# Patient Record
Sex: FEMALE | Race: WHITE | NOT HISPANIC OR LATINO
[De-identification: names, ages, dates, MRNs, and addresses within clinical notes are randomized per-mention and may not be internally consistent; named-entity substitution may affect disease eponyms.]

---

## 2017-10-24 ENCOUNTER — APPOINTMENT (OUTPATIENT)
Dept: GASTROENTEROLOGY | Facility: CLINIC | Age: 27
End: 2017-10-24

## 2022-09-09 ENCOUNTER — TRANSCRIPTION ENCOUNTER (OUTPATIENT)
Age: 32
End: 2022-09-09

## 2022-09-10 ENCOUNTER — RESULT REVIEW (OUTPATIENT)
Age: 32
End: 2022-09-10

## 2022-09-10 ENCOUNTER — TRANSCRIPTION ENCOUNTER (OUTPATIENT)
Age: 32
End: 2022-09-10

## 2022-09-10 ENCOUNTER — INPATIENT (INPATIENT)
Facility: HOSPITAL | Age: 32
LOS: 0 days | Discharge: HOME | DRG: 817 | End: 2022-09-10
Attending: OBSTETRICS & GYNECOLOGY | Admitting: OBSTETRICS & GYNECOLOGY
Payer: COMMERCIAL

## 2022-09-10 VITALS
WEIGHT: 130.07 LBS | RESPIRATION RATE: 18 BRPM | TEMPERATURE: 98 F | HEART RATE: 98 BPM | OXYGEN SATURATION: 98 % | DIASTOLIC BLOOD PRESSURE: 78 MMHG | SYSTOLIC BLOOD PRESSURE: 126 MMHG

## 2022-09-10 VITALS
HEART RATE: 67 BPM | DIASTOLIC BLOOD PRESSURE: 64 MMHG | RESPIRATION RATE: 18 BRPM | SYSTOLIC BLOOD PRESSURE: 119 MMHG | OXYGEN SATURATION: 100 %

## 2022-09-10 DIAGNOSIS — O00.90 UNSPECIFIED ECTOPIC PREGNANCY WITHOUT INTRAUTERINE PREGNANCY: ICD-10-CM

## 2022-09-10 LAB
ALBUMIN SERPL ELPH-MCNC: 4.3 G/DL — SIGNIFICANT CHANGE UP (ref 3.3–5.2)
ALP SERPL-CCNC: 111 U/L — SIGNIFICANT CHANGE UP (ref 40–120)
ALT FLD-CCNC: 19 U/L — SIGNIFICANT CHANGE UP
ANION GAP SERPL CALC-SCNC: 12 MMOL/L — SIGNIFICANT CHANGE UP (ref 5–17)
APPEARANCE UR: CLEAR — SIGNIFICANT CHANGE UP
AST SERPL-CCNC: 19 U/L — SIGNIFICANT CHANGE UP
BASOPHILS # BLD AUTO: 0.09 K/UL — SIGNIFICANT CHANGE UP (ref 0–0.2)
BASOPHILS NFR BLD AUTO: 0.6 % — SIGNIFICANT CHANGE UP (ref 0–2)
BILIRUB SERPL-MCNC: 0.4 MG/DL — SIGNIFICANT CHANGE UP (ref 0.4–2)
BILIRUB UR-MCNC: NEGATIVE — SIGNIFICANT CHANGE UP
BLD GP AB SCN SERPL QL: SIGNIFICANT CHANGE UP
BUN SERPL-MCNC: 14.9 MG/DL — SIGNIFICANT CHANGE UP (ref 8–20)
CALCIUM SERPL-MCNC: 9.2 MG/DL — SIGNIFICANT CHANGE UP (ref 8.4–10.5)
CHLORIDE SERPL-SCNC: 98 MMOL/L — SIGNIFICANT CHANGE UP (ref 98–107)
CO2 SERPL-SCNC: 26 MMOL/L — SIGNIFICANT CHANGE UP (ref 22–29)
COLOR SPEC: YELLOW — SIGNIFICANT CHANGE UP
CREAT SERPL-MCNC: 0.58 MG/DL — SIGNIFICANT CHANGE UP (ref 0.5–1.3)
DIFF PNL FLD: ABNORMAL
EGFR: 124 ML/MIN/1.73M2 — SIGNIFICANT CHANGE UP
EOSINOPHIL # BLD AUTO: 0.26 K/UL — SIGNIFICANT CHANGE UP (ref 0–0.5)
EOSINOPHIL NFR BLD AUTO: 1.9 % — SIGNIFICANT CHANGE UP (ref 0–6)
EPI CELLS # UR: SIGNIFICANT CHANGE UP
FLUAV AG NPH QL: SIGNIFICANT CHANGE UP
FLUBV AG NPH QL: SIGNIFICANT CHANGE UP
GLUCOSE SERPL-MCNC: 104 MG/DL — HIGH (ref 70–99)
GLUCOSE UR QL: NEGATIVE MG/DL — SIGNIFICANT CHANGE UP
HCG SERPL-ACNC: 871.9 MIU/ML — HIGH
HCT VFR BLD CALC: 38 % — SIGNIFICANT CHANGE UP (ref 34.5–45)
HGB BLD-MCNC: 12.1 G/DL — SIGNIFICANT CHANGE UP (ref 11.5–15.5)
IMM GRANULOCYTES NFR BLD AUTO: 0.5 % — SIGNIFICANT CHANGE UP (ref 0–1.5)
KETONES UR-MCNC: NEGATIVE — SIGNIFICANT CHANGE UP
LEUKOCYTE ESTERASE UR-ACNC: ABNORMAL
LYMPHOCYTES # BLD AUTO: 17.9 % — SIGNIFICANT CHANGE UP (ref 13–44)
LYMPHOCYTES # BLD AUTO: 2.48 K/UL — SIGNIFICANT CHANGE UP (ref 1–3.3)
MCHC RBC-ENTMCNC: 30.5 PG — SIGNIFICANT CHANGE UP (ref 27–34)
MCHC RBC-ENTMCNC: 31.8 GM/DL — LOW (ref 32–36)
MCV RBC AUTO: 95.7 FL — SIGNIFICANT CHANGE UP (ref 80–100)
MONOCYTES # BLD AUTO: 0.98 K/UL — HIGH (ref 0–0.9)
MONOCYTES NFR BLD AUTO: 7.1 % — SIGNIFICANT CHANGE UP (ref 2–14)
NEUTROPHILS # BLD AUTO: 9.98 K/UL — HIGH (ref 1.8–7.4)
NEUTROPHILS NFR BLD AUTO: 72 % — SIGNIFICANT CHANGE UP (ref 43–77)
NITRITE UR-MCNC: NEGATIVE — SIGNIFICANT CHANGE UP
PH UR: 6 — SIGNIFICANT CHANGE UP (ref 5–8)
PLATELET # BLD AUTO: 278 K/UL — SIGNIFICANT CHANGE UP (ref 150–400)
POTASSIUM SERPL-MCNC: 3.8 MMOL/L — SIGNIFICANT CHANGE UP (ref 3.5–5.3)
POTASSIUM SERPL-SCNC: 3.8 MMOL/L — SIGNIFICANT CHANGE UP (ref 3.5–5.3)
PROT SERPL-MCNC: 7.3 G/DL — SIGNIFICANT CHANGE UP (ref 6.6–8.7)
PROT UR-MCNC: NEGATIVE — SIGNIFICANT CHANGE UP
RBC # BLD: 3.97 M/UL — SIGNIFICANT CHANGE UP (ref 3.8–5.2)
RBC # FLD: 12.5 % — SIGNIFICANT CHANGE UP (ref 10.3–14.5)
RBC CASTS # UR COMP ASSIST: SIGNIFICANT CHANGE UP /HPF (ref 0–4)
RSV RNA NPH QL NAA+NON-PROBE: SIGNIFICANT CHANGE UP
SARS-COV-2 RNA SPEC QL NAA+PROBE: SIGNIFICANT CHANGE UP
SODIUM SERPL-SCNC: 136 MMOL/L — SIGNIFICANT CHANGE UP (ref 135–145)
SP GR SPEC: 1.02 — SIGNIFICANT CHANGE UP (ref 1.01–1.02)
UROBILINOGEN FLD QL: NEGATIVE MG/DL — SIGNIFICANT CHANGE UP
WBC # BLD: 13.86 K/UL — HIGH (ref 3.8–10.5)
WBC # FLD AUTO: 13.86 K/UL — HIGH (ref 3.8–10.5)
WBC UR QL: SIGNIFICANT CHANGE UP /HPF (ref 0–5)

## 2022-09-10 PROCEDURE — 81001 URINALYSIS AUTO W/SCOPE: CPT

## 2022-09-10 PROCEDURE — 76801 OB US < 14 WKS SINGLE FETUS: CPT | Mod: 26

## 2022-09-10 PROCEDURE — 76801 OB US < 14 WKS SINGLE FETUS: CPT

## 2022-09-10 PROCEDURE — 85025 COMPLETE CBC W/AUTO DIFF WBC: CPT

## 2022-09-10 PROCEDURE — 86850 RBC ANTIBODY SCREEN: CPT

## 2022-09-10 PROCEDURE — 76817 TRANSVAGINAL US OBSTETRIC: CPT

## 2022-09-10 PROCEDURE — 80053 COMPREHEN METABOLIC PANEL: CPT

## 2022-09-10 PROCEDURE — 58661 LAPAROSCOPY REMOVE ADNEXA: CPT

## 2022-09-10 PROCEDURE — 86900 BLOOD TYPING SEROLOGIC ABO: CPT

## 2022-09-10 PROCEDURE — 87637 SARSCOV2&INF A&B&RSV AMP PRB: CPT

## 2022-09-10 PROCEDURE — 36415 COLL VENOUS BLD VENIPUNCTURE: CPT

## 2022-09-10 PROCEDURE — 87086 URINE CULTURE/COLONY COUNT: CPT

## 2022-09-10 PROCEDURE — 99285 EMERGENCY DEPT VISIT HI MDM: CPT

## 2022-09-10 PROCEDURE — 86901 BLOOD TYPING SEROLOGIC RH(D): CPT

## 2022-09-10 PROCEDURE — 96374 THER/PROPH/DIAG INJ IV PUSH: CPT

## 2022-09-10 PROCEDURE — 76817 TRANSVAGINAL US OBSTETRIC: CPT | Mod: 26

## 2022-09-10 PROCEDURE — 84702 CHORIONIC GONADOTROPIN TEST: CPT

## 2022-09-10 PROCEDURE — 88305 TISSUE EXAM BY PATHOLOGIST: CPT | Mod: 26

## 2022-09-10 PROCEDURE — 88305 TISSUE EXAM BY PATHOLOGIST: CPT

## 2022-09-10 RX ORDER — HYDROMORPHONE HYDROCHLORIDE 2 MG/ML
0.5 INJECTION INTRAMUSCULAR; INTRAVENOUS; SUBCUTANEOUS
Refills: 0 | Status: DISCONTINUED | OUTPATIENT
Start: 2022-09-10 | End: 2022-09-10

## 2022-09-10 RX ORDER — OXYCODONE AND ACETAMINOPHEN 5; 325 MG/1; MG/1
1 TABLET ORAL
Qty: 20 | Refills: 0
Start: 2022-09-10 | End: 2022-09-14

## 2022-09-10 RX ORDER — HYDROMORPHONE HYDROCHLORIDE 2 MG/ML
1 INJECTION INTRAMUSCULAR; INTRAVENOUS; SUBCUTANEOUS
Refills: 0 | Status: DISCONTINUED | OUTPATIENT
Start: 2022-09-10 | End: 2022-09-10

## 2022-09-10 RX ORDER — SODIUM CHLORIDE 9 MG/ML
1000 INJECTION, SOLUTION INTRAVENOUS
Refills: 0 | Status: DISCONTINUED | OUTPATIENT
Start: 2022-09-10 | End: 2022-09-10

## 2022-09-10 RX ORDER — SODIUM CHLORIDE 9 MG/ML
3 INJECTION INTRAMUSCULAR; INTRAVENOUS; SUBCUTANEOUS ONCE
Refills: 0 | Status: DISCONTINUED | OUTPATIENT
Start: 2022-09-10 | End: 2022-09-10

## 2022-09-10 RX ORDER — CEFTRIAXONE 500 MG/1
1000 INJECTION, POWDER, FOR SOLUTION INTRAMUSCULAR; INTRAVENOUS ONCE
Refills: 0 | Status: DISCONTINUED | OUTPATIENT
Start: 2022-09-10 | End: 2022-09-10

## 2022-09-10 RX ORDER — ACETAMINOPHEN 500 MG
1000 TABLET ORAL ONCE
Refills: 0 | Status: COMPLETED | OUTPATIENT
Start: 2022-09-10 | End: 2022-09-10

## 2022-09-10 RX ADMIN — HYDROMORPHONE HYDROCHLORIDE 0.5 MILLIGRAM(S): 2 INJECTION INTRAMUSCULAR; INTRAVENOUS; SUBCUTANEOUS at 12:52

## 2022-09-10 RX ADMIN — Medication 400 MILLIGRAM(S): at 02:40

## 2022-09-10 RX ADMIN — HYDROMORPHONE HYDROCHLORIDE 0.5 MILLIGRAM(S): 2 INJECTION INTRAMUSCULAR; INTRAVENOUS; SUBCUTANEOUS at 12:38

## 2022-09-10 NOTE — ASU DISCHARGE PLAN (ADULT/PEDIATRIC) - NS MD DC FALL RISK RISK
For information on Fall & Injury Prevention, visit: [email or web address] OR  [email or web address] OR  https://www.cdc.gov/steadi/patient.html

## 2022-09-10 NOTE — H&P ADULT - NSHPLABSRESULTS_GEN_ALL_CORE
Labs:               12.1   13.86 )-----------( 278      ( 10 Sep 2022 02:42 )             38.0     09-10    136  |  98  |  14.9  ----------------------------<  104<H>  3.8   |  26.0  |  0.58    Ca    9.2      10 Sep 2022 02:42    TPro  7.3  /  Alb  4.3  /  TBili  0.4  /  DBili  x   /  AST  19  /  ALT  19  /  AlkPhos  111  09-10    SERUM bhcg    871.9  09-10 @ 02:42    ABO RH Interpretation: A POS: 09/10/2022 5:02     Radiology:  < from: US Transvaginal, OB (09.10.22 @ 06:27) >    INTERPRETATION:  PELVIC ULTRASOUND - EARLY OB    INDICATION: Vaginal bleeding.    TECHNIQUE: Real-time grayscale, color, and spectral Doppler of the female   pelvis was performed. Transabdominal and endovaginal scanning was   performed.    COMPARISON: None.    FINDINGS:    Uterus: 6.7 x 3.2 x 3.8 cm. Endometrial echocomplex measures 8 mm. No   intrauterine gestational sac or fetal pole identified.    Ovaries:  The right ovary measures 2.8 x 1.8 x 1.8 cm.  Vascular flow is normal.   Vague indeterminate echogenic structure medial to the right ovary   measuring 1.8 x 0.9 x 1.3 cm with increased peripheral vascularity.    The left ovary measures 2.6 x 1.9 x 2.1 cm containing probable corpus   luteum cyst measuring up to 1.7 cm. Vascular flow is normal. No adnexal   mass seen.    There is mild free fluid seen in the pelvis.    IMPRESSION:    No intrauterine gestational sac or fetal pole identified. Differential   includes early pregnancy, missed  or ectopic pregnancy.    Vague indeterminate echogenic structure medial to the right ovary   measuring 1.8 x 0.9 x 1.3 cm with increased peripheral vascularity.    Right adnexal ectopic pregnancy difficult to exclude. OB/GYN   consultation, correlation with serial beta-hCG and short-term imaging   follow-up is advised. Mild pelvic free fluid.    These results were discussed via telephone at 9/10/2022 6:33 AM by Dr. Carlson of radiology with Dr. Esparza, read-back was followed.    --- End of Report ---    < end of copied text >

## 2022-09-10 NOTE — ED ADULT TRIAGE NOTE - CHIEF COMPLAINT QUOTE
pt c/o abdominal pain and vaginal bleeding started last night, + pregnancy unsure far long, hx anxiety and depression,

## 2022-09-10 NOTE — ASU DISCHARGE PLAN (ADULT/PEDIATRIC) - CARE PROVIDER_API CALL
Brandyn Connell)  Obstetrics and Gynecology  [address] [facility], [address]  [place] [address]  Phone: [phone]  Fax: [phone]  Follow Up Time: 2 weeks

## 2022-09-10 NOTE — H&P ADULT - HISTORY OF PRESENT ILLNESS
BHAVIK SANCHEZ is a 32y  who presents to ED with mild spotting and cramping abdominal pain of 3d duration.    +preg test at urgent care on Wed. On Thurs began experiencing cramping and bleeding, went to [facility], BHCG 739 with no signs of ectopic on US. O/N bleeding, cramping, right shoulder pain; was supposed to return to [facility] for repeat beta, but came to [facility] ED instead. She currently still has cramping abdominal pain that improved post tylenol. Reports mild spotting.     OBhx:   G1:  SAB, expectant  G2:  CS, elective   Gyn: denies  PMH: anxiety  PSH: CS , arthroscopy   Med: xanax 1mg TID, selexa 20 qd  Allergies: metrogel (hives)

## 2022-09-10 NOTE — BRIEF OPERATIVE NOTE - NSICDXBRIEFPOSTOP_GEN_ALL_CORE_FT
POST-OP DIAGNOSIS:  Ruptured right tubal ectopic pregnancy causing hemoperitoneum 10-Sep-2022 12:17:38  Angeli Champion

## 2022-09-10 NOTE — ED PROVIDER NOTE - OBJECTIVE STATEMENT
31 y/o female,  with PMHx of Anxiety, Depression, Interstitial Cystitis presents with severe right shoulder pain, abdominal cramping, vaginal bleeding. Right shoulder pain started about 3 hours PTA. Abdominal cramping and vaginal bleeding began on . Reports she went to urgent care for UTI symptoms and tested positive for pregnancy approximately 48 hours ago on . Had a transvaginal US, transabdominal US done due to severe symptoms to rule out ectopic pregnancy. Nothing was seen with imaging. Hcg of 739 on . No pain medication taken besides Keflex.

## 2022-09-10 NOTE — ED ADULT NURSE REASSESSMENT NOTE - NS ED NURSE REASSESS COMMENT FT1
informed and aware of plan to take to OR for ectopic pregnancy, tearful but verbalizes understanding of plan

## 2022-09-10 NOTE — BRIEF OPERATIVE NOTE - OPERATION/FINDINGS
Mobile, anteverted uterus; grossly normal uterus, b/l ovaries, and left fallopian tube; right fallopian tube containing ruptured pregnancy. Hemoperitoneum present, ~100cc.

## 2022-09-10 NOTE — ED ADULT NURSE NOTE - OBJECTIVE STATEMENT
pt A&Ox3 states she has been having abdominal pain with nausea for some time now and was told by her GYN to have CT done to rule out ectopic pregnancy. pt states she had been having heavy bleeding and decided to go to Emergency Department. pt updated on plan of care medicated and awaiting lab results

## 2022-09-10 NOTE — H&P ADULT - ASSESSMENT
A/P: BHAVIK SANCHEZ is a 32y  who presents to ED with mild spotting and cramping abdominal pain of 3d duration.     - VS wnl, H/H wnl, A+, abdomen tender but benign  - Beta 739 () > 871.9 today. Inappropriate rise  - TVUS showing [id number] right adnexal mass with periperhal vascularity, no IUP noted, mild pelvic free fluid.  - Clinical presentation, beta, and US findings c/w ectopic pregnancy, unruptured.   - Plan for diagnostic laparoscopy: admit to GYN, pre op labs, NPO    D/w Dr. Connell

## 2022-09-11 LAB
CULTURE RESULTS: SIGNIFICANT CHANGE UP
SPECIMEN SOURCE: SIGNIFICANT CHANGE UP

## 2022-09-14 LAB — SURGICAL PATHOLOGY STUDY: SIGNIFICANT CHANGE UP

## 2023-03-09 NOTE — BRIEF OPERATIVE NOTE - VENOUS THROMBOEMBOLISM PROPHYLAXIS THERAPY
- Please to call PCP if redness, drainage or fevers are present.  - Return to ER in ~2 weeks to have the stitches removed   
SCDs

## 2023-10-02 ENCOUNTER — APPOINTMENT (OUTPATIENT)
Dept: CARDIOLOGY | Facility: CLINIC | Age: 33
End: 2023-10-02
Payer: MEDICAID

## 2023-10-02 VITALS
HEIGHT: 63 IN | DIASTOLIC BLOOD PRESSURE: 50 MMHG | SYSTOLIC BLOOD PRESSURE: 110 MMHG | WEIGHT: 172 LBS | BODY MASS INDEX: 30.48 KG/M2

## 2023-10-02 VITALS — SYSTOLIC BLOOD PRESSURE: 118 MMHG | DIASTOLIC BLOOD PRESSURE: 52 MMHG

## 2023-10-02 DIAGNOSIS — Z01.810 ENCOUNTER FOR PREPROCEDURAL CARDIOVASCULAR EXAMINATION: ICD-10-CM

## 2023-10-02 DIAGNOSIS — I34.1 NONRHEUMATIC MITRAL (VALVE) PROLAPSE: ICD-10-CM

## 2023-10-02 PROCEDURE — 99203 OFFICE O/P NEW LOW 30 MIN: CPT | Mod: 25

## 2023-10-02 PROCEDURE — 93000 ELECTROCARDIOGRAM COMPLETE: CPT

## 2023-12-06 ENCOUNTER — APPOINTMENT (OUTPATIENT)
Dept: CARDIOLOGY | Facility: CLINIC | Age: 33
End: 2023-12-06

## 2024-01-29 ENCOUNTER — APPOINTMENT (OUTPATIENT)
Dept: CARDIOLOGY | Facility: CLINIC | Age: 34
End: 2024-01-29

## 2024-03-25 ENCOUNTER — NON-APPOINTMENT (OUTPATIENT)
Age: 34
End: 2024-03-25

## 2024-03-25 ENCOUNTER — APPOINTMENT (OUTPATIENT)
Dept: RHEUMATOLOGY | Facility: CLINIC | Age: 34
End: 2024-03-25
Payer: MEDICAID

## 2024-03-25 VITALS
HEIGHT: 63 IN | DIASTOLIC BLOOD PRESSURE: 70 MMHG | SYSTOLIC BLOOD PRESSURE: 120 MMHG | BODY MASS INDEX: 28.17 KG/M2 | OXYGEN SATURATION: 98 % | TEMPERATURE: 97.2 F | HEART RATE: 64 BPM | WEIGHT: 159 LBS

## 2024-03-25 DIAGNOSIS — M25.50 PAIN IN UNSPECIFIED JOINT: ICD-10-CM

## 2024-03-25 DIAGNOSIS — A69.20 LYME DISEASE, UNSPECIFIED: ICD-10-CM

## 2024-03-25 DIAGNOSIS — Z82.69 FAMILY HISTORY OF OTHER DISEASES OF THE MUSCULOSKELETAL SYSTEM AND CONNECTIVE TISSUE: ICD-10-CM

## 2024-03-25 PROCEDURE — 99204 OFFICE O/P NEW MOD 45 MIN: CPT

## 2024-03-25 RX ORDER — CHOLECALCIFEROL (VITAMIN D3) 25 MCG
TABLET ORAL
Refills: 0 | Status: ACTIVE | COMMUNITY

## 2024-03-25 RX ORDER — DOXYCYCLINE HYCLATE 100 MG/1
100 CAPSULE ORAL
Qty: 56 | Refills: 0 | Status: ACTIVE | COMMUNITY
Start: 2024-03-25 | End: 1900-01-01

## 2024-03-25 RX ORDER — DEXTROAMPHETAMINE SACCHARATE, AMPHETAMINE ASPARTATE, DEXTROAMPHETAMINE SULFATE, AND AMPHETAMINE SULFATE 5; 5; 5; 5 MG/1; MG/1; MG/1; MG/1
20 TABLET ORAL
Refills: 0 | Status: ACTIVE | COMMUNITY

## 2024-03-25 RX ORDER — ALPRAZOLAM 1 MG/1
1 TABLET ORAL
Refills: 0 | Status: ACTIVE | COMMUNITY

## 2024-03-26 PROBLEM — Z82.69 FAMILY HISTORY OF SYSTEMIC LUPUS ERYTHEMATOSUS: Status: ACTIVE | Noted: 2024-03-26

## 2024-03-26 PROBLEM — A69.20 LYME DISEASE, UNSPECIFIED: Status: ACTIVE | Noted: 2024-03-26

## 2024-03-26 PROBLEM — M25.50 ARTHRALGIA, UNSPECIFIED JOINT: Status: ACTIVE | Noted: 2024-03-25

## 2024-03-26 NOTE — REVIEW OF SYSTEMS
[Feeling Tired] : feeling tired [Dry Eyes] : dryness of the eyes [As Noted in HPI] : as noted in HPI [Joint Pain] : joint pain [Arthralgias] : arthralgias [Joint Swelling] : no joint swelling [Joint Stiffness] : joint stiffness [Negative] : Heme/Lymph

## 2024-03-26 NOTE — PHYSICAL EXAM
[General Appearance - Alert] : alert [General Appearance - In No Acute Distress] : in no acute distress [General Appearance - Well Developed] : well developed [General Appearance - Well-Appearing] : healthy appearing [Sclera] : the sclera and conjunctiva were normal [Extraocular Movements] : extraocular movements were intact [Outer Ear] : the ears and nose were normal in appearance [Exaggerated Use Of Accessory Muscles For Inspiration] : no accessory muscle use [Edema] : there was no peripheral edema [FreeTextEntry1] : no joint tenderness on exam  [Musculoskeletal - Swelling] : no joint swelling seen [Skin Color & Pigmentation] : normal skin color and pigmentation [] : no rash [No Focal Deficits] : no focal deficits [Skin Lesions] : no skin lesions [Oriented To Time, Place, And Person] : oriented to person, place, and time [Affect] : the affect was normal [Mood] : the mood was normal
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
## 2024-03-26 NOTE — ASSESSMENT
[FreeTextEntry1] : 34F here for evaluation of fatigue and polyarticular joint pains. Notably, had a recent positive Lyme IgM test in 1/2024 checked by PCP. QUITA, Sjogrens, and Rheumatoid factor were negative at the time.   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  US of LUE has been ordered from L. shoulder to L. elbow to evaluate for tendinopathy, as Xrays recently have reportedly shown no joint abnormality/arthritis.   Further plan to follow pending results of the above blood tests and US. Patient was advised to see an infectious disease specialist if joint pain/symptoms persist even after completing the course of doxycycline.

## 2024-05-04 ENCOUNTER — NON-APPOINTMENT (OUTPATIENT)
Age: 34
End: 2024-05-04

## 2024-05-12 ENCOUNTER — EMERGENCY (EMERGENCY)
Facility: HOSPITAL | Age: 34
LOS: 1 days | Discharge: HOME | End: 2024-05-12
Attending: EMERGENCY MEDICINE
Payer: MEDICAID

## 2024-05-12 VITALS
TEMPERATURE: 98 F | SYSTOLIC BLOOD PRESSURE: 104 MMHG | HEART RATE: 68 BPM | OXYGEN SATURATION: 96 % | RESPIRATION RATE: 19 BRPM | DIASTOLIC BLOOD PRESSURE: 68 MMHG

## 2024-05-12 VITALS
OXYGEN SATURATION: 99 % | WEIGHT: 159.84 LBS | HEART RATE: 99 BPM | RESPIRATION RATE: 16 BRPM | DIASTOLIC BLOOD PRESSURE: 81 MMHG | TEMPERATURE: 98 F | SYSTOLIC BLOOD PRESSURE: 128 MMHG | HEIGHT: 63 IN

## 2024-05-12 LAB
ALBUMIN SERPL ELPH-MCNC: 4.2 G/DL — SIGNIFICANT CHANGE UP (ref 3.3–5.2)
ALP SERPL-CCNC: 100 U/L — SIGNIFICANT CHANGE UP (ref 40–120)
ALT FLD-CCNC: 21 U/L — SIGNIFICANT CHANGE UP
ANION GAP SERPL CALC-SCNC: 13 MMOL/L — SIGNIFICANT CHANGE UP (ref 5–17)
APPEARANCE UR: ABNORMAL
AST SERPL-CCNC: 20 U/L — SIGNIFICANT CHANGE UP
BACTERIA # UR AUTO: ABNORMAL /HPF
BASOPHILS # BLD AUTO: 0.06 K/UL — SIGNIFICANT CHANGE UP (ref 0–0.2)
BASOPHILS NFR BLD AUTO: 0.4 % — SIGNIFICANT CHANGE UP (ref 0–2)
BILIRUB SERPL-MCNC: 0.4 MG/DL — SIGNIFICANT CHANGE UP (ref 0.4–2)
BILIRUB UR-MCNC: NEGATIVE — SIGNIFICANT CHANGE UP
BLD GP AB SCN SERPL QL: SIGNIFICANT CHANGE UP
BUN SERPL-MCNC: 19.4 MG/DL — SIGNIFICANT CHANGE UP (ref 8–20)
CALCIUM SERPL-MCNC: 8.9 MG/DL — SIGNIFICANT CHANGE UP (ref 8.4–10.5)
CAST: 0 /LPF — SIGNIFICANT CHANGE UP (ref 0–4)
CHLORIDE SERPL-SCNC: 99 MMOL/L — SIGNIFICANT CHANGE UP (ref 96–108)
CO2 SERPL-SCNC: 24 MMOL/L — SIGNIFICANT CHANGE UP (ref 22–29)
COLOR SPEC: YELLOW — SIGNIFICANT CHANGE UP
CREAT SERPL-MCNC: 0.65 MG/DL — SIGNIFICANT CHANGE UP (ref 0.5–1.3)
DIFF PNL FLD: NEGATIVE — SIGNIFICANT CHANGE UP
EGFR: 119 ML/MIN/1.73M2 — SIGNIFICANT CHANGE UP
EOSINOPHIL # BLD AUTO: 0.46 K/UL — SIGNIFICANT CHANGE UP (ref 0–0.5)
EOSINOPHIL NFR BLD AUTO: 3.2 % — SIGNIFICANT CHANGE UP (ref 0–6)
GLUCOSE SERPL-MCNC: 106 MG/DL — HIGH (ref 70–99)
GLUCOSE UR QL: NEGATIVE MG/DL — SIGNIFICANT CHANGE UP
HCG SERPL-ACNC: 284.4 MIU/ML — HIGH
HCT VFR BLD CALC: 40.7 % — SIGNIFICANT CHANGE UP (ref 34.5–45)
HGB BLD-MCNC: 13.2 G/DL — SIGNIFICANT CHANGE UP (ref 11.5–15.5)
IMM GRANULOCYTES NFR BLD AUTO: 0.3 % — SIGNIFICANT CHANGE UP (ref 0–0.9)
KETONES UR-MCNC: ABNORMAL MG/DL
LEUKOCYTE ESTERASE UR-ACNC: NEGATIVE — SIGNIFICANT CHANGE UP
LYMPHOCYTES # BLD AUTO: 27.1 % — SIGNIFICANT CHANGE UP (ref 13–44)
LYMPHOCYTES # BLD AUTO: 3.9 K/UL — HIGH (ref 1–3.3)
MCHC RBC-ENTMCNC: 29.9 PG — SIGNIFICANT CHANGE UP (ref 27–34)
MCHC RBC-ENTMCNC: 32.4 GM/DL — SIGNIFICANT CHANGE UP (ref 32–36)
MCV RBC AUTO: 92.3 FL — SIGNIFICANT CHANGE UP (ref 80–100)
MONOCYTES # BLD AUTO: 1.02 K/UL — HIGH (ref 0–0.9)
MONOCYTES NFR BLD AUTO: 7.1 % — SIGNIFICANT CHANGE UP (ref 2–14)
NEUTROPHILS # BLD AUTO: 8.93 K/UL — HIGH (ref 1.8–7.4)
NEUTROPHILS NFR BLD AUTO: 61.9 % — SIGNIFICANT CHANGE UP (ref 43–77)
NITRITE UR-MCNC: NEGATIVE — SIGNIFICANT CHANGE UP
PH UR: 6 — SIGNIFICANT CHANGE UP (ref 5–8)
PLATELET # BLD AUTO: 294 K/UL — SIGNIFICANT CHANGE UP (ref 150–400)
POTASSIUM SERPL-MCNC: 4 MMOL/L — SIGNIFICANT CHANGE UP (ref 3.5–5.3)
POTASSIUM SERPL-SCNC: 4 MMOL/L — SIGNIFICANT CHANGE UP (ref 3.5–5.3)
PROT SERPL-MCNC: 7.1 G/DL — SIGNIFICANT CHANGE UP (ref 6.6–8.7)
PROT UR-MCNC: NEGATIVE MG/DL — SIGNIFICANT CHANGE UP
RBC # BLD: 4.41 M/UL — SIGNIFICANT CHANGE UP (ref 3.8–5.2)
RBC # FLD: 12.2 % — SIGNIFICANT CHANGE UP (ref 10.3–14.5)
RBC CASTS # UR COMP ASSIST: 0 /HPF — SIGNIFICANT CHANGE UP (ref 0–4)
SODIUM SERPL-SCNC: 136 MMOL/L — SIGNIFICANT CHANGE UP (ref 135–145)
SP GR SPEC: >1.03 — HIGH (ref 1–1.03)
SQUAMOUS # UR AUTO: 21 /HPF — HIGH (ref 0–5)
UROBILINOGEN FLD QL: 1 MG/DL — SIGNIFICANT CHANGE UP (ref 0.2–1)
WBC # BLD: 14.41 K/UL — HIGH (ref 3.8–10.5)
WBC # FLD AUTO: 14.41 K/UL — HIGH (ref 3.8–10.5)
WBC UR QL: 2 /HPF — SIGNIFICANT CHANGE UP (ref 0–5)

## 2024-05-12 PROCEDURE — 99284 EMERGENCY DEPT VISIT MOD MDM: CPT | Mod: 25

## 2024-05-12 PROCEDURE — 96374 THER/PROPH/DIAG INJ IV PUSH: CPT

## 2024-05-12 PROCEDURE — 76801 OB US < 14 WKS SINGLE FETUS: CPT

## 2024-05-12 PROCEDURE — 99284 EMERGENCY DEPT VISIT MOD MDM: CPT

## 2024-05-12 PROCEDURE — 80053 COMPREHEN METABOLIC PANEL: CPT

## 2024-05-12 PROCEDURE — 76801 OB US < 14 WKS SINGLE FETUS: CPT | Mod: 26

## 2024-05-12 PROCEDURE — 84702 CHORIONIC GONADOTROPIN TEST: CPT

## 2024-05-12 PROCEDURE — 86850 RBC ANTIBODY SCREEN: CPT

## 2024-05-12 PROCEDURE — 81001 URINALYSIS AUTO W/SCOPE: CPT

## 2024-05-12 PROCEDURE — 86901 BLOOD TYPING SEROLOGIC RH(D): CPT

## 2024-05-12 PROCEDURE — 87086 URINE CULTURE/COLONY COUNT: CPT

## 2024-05-12 PROCEDURE — 85025 COMPLETE CBC W/AUTO DIFF WBC: CPT

## 2024-05-12 PROCEDURE — 76817 TRANSVAGINAL US OBSTETRIC: CPT

## 2024-05-12 PROCEDURE — 76817 TRANSVAGINAL US OBSTETRIC: CPT | Mod: 26

## 2024-05-12 PROCEDURE — 86900 BLOOD TYPING SEROLOGIC ABO: CPT

## 2024-05-12 PROCEDURE — 36415 COLL VENOUS BLD VENIPUNCTURE: CPT

## 2024-05-12 RX ORDER — ACETAMINOPHEN 500 MG
1000 TABLET ORAL ONCE
Refills: 0 | Status: COMPLETED | OUTPATIENT
Start: 2024-05-12 | End: 2024-05-12

## 2024-05-12 RX ADMIN — Medication 400 MILLIGRAM(S): at 01:35

## 2024-05-12 NOTE — ED PROVIDER NOTE - OBJECTIVE STATEMENT
34yof w/ hx of right sided ectopic pregnancy currently about 4wks pregnant by dates p/w crampy lower abdominal pain and pain in the left neck and shoulder. Previously had similar shoulder pain with her ectopic. No vaginal bleeding 34yof w/ hx of right sided ectopic pregnancy states 4wks pregnant by dates p/w crampy lower abdominal pain and pain in the left neck and shoulder. Previously had similar shoulder pain with her ectopic. No vaginal bleeding

## 2024-05-12 NOTE — ED ADULT NURSE NOTE - NSSUHOSCREENINGYN_ED_ALL_ED
Faxed over last chart note to Dr. Kevin Doyle's office [phone], [phone] per pt's request for her appt tomorrow.   
Yes - the patient is able to be screened

## 2024-05-12 NOTE — ED ADULT NURSE NOTE - OBJECTIVE STATEMENT
A&OX4, Presents to ed with complaints of bloating, shoulder and neck pain, patient reports hx of ectopic pregnancy in 9/2022, patient says " I think im having an ectopic on my left side", denies headache, nausea, vaginal bleeding. Reports feeling tired and dizziness.

## 2024-05-12 NOTE — ED PROVIDER NOTE - NSFOLLOWUPINSTRUCTIONS_ED_ALL_ED_FT
Follow up with your obgyn as soon as possible or return to the ER in 48hrs for a repeat HCG  Return to the ER with any new, worsening or persistent symptoms
Home

## 2024-05-12 NOTE — ED PROVIDER NOTE - PATIENT PORTAL LINK FT
You can access the FollowMyHealth Patient Portal offered by [facility] by registering at the following website: [email or web address]. By joining [employer]’s FollowMyHealth portal, you will also be able to view your health information using other applications (apps) compatible with our system.

## 2024-05-12 NOTE — ED ADULT NURSE NOTE - NSFALLUNIVINTERV_ED_ALL_ED
Bed/Stretcher in lowest position, wheels locked, appropriate side rails in place/Call bell, personal items and telephone in reach/Instruct patient to call for assistance before getting out of bed/chair/stretcher/Non-slip footwear applied when patient is off stretcher/[place] to call system/Physically safe environment - no spills, clutter or unnecessary equipment/Purposeful proactive rounding/Room/bathroom lighting operational, light cord in reach

## 2024-05-12 NOTE — ED PROVIDER NOTE - CLINICAL SUMMARY MEDICAL DECISION MAKING FREE TEXT BOX
HCG <300 with no prior for comparison, reassuring abdominal exam with no peritoneal signs, hemodynamically stable with normal H/H. TVUS with no visualized IUP but also no suspicious adnexal masses or free fluid. Pt has follow up already arranged with her GYN and understands the importance of follow up for serial HCGs.

## 2024-05-12 NOTE — ED ADULT TRIAGE NOTE - CHIEF COMPLAINT QUOTE
pt c/o bloating & cramping, shoulder & neck pain, light vaginal bleeding, + pregnancy 3 weeks 4 days, think Im having an ectopic, 9/22 was last ectopic on right  A&Ox3, resp wnl, pt is high risk, LMP 4/14/2024

## 2024-05-13 ENCOUNTER — TRANSCRIPTION ENCOUNTER (OUTPATIENT)
Age: 34
End: 2024-05-13

## 2024-05-13 ENCOUNTER — LABORATORY RESULT (OUTPATIENT)
Age: 34
End: 2024-05-13

## 2024-05-13 ENCOUNTER — APPOINTMENT (OUTPATIENT)
Dept: INTERNAL MEDICINE | Facility: CLINIC | Age: 34
End: 2024-05-13

## 2024-05-13 VITALS
OXYGEN SATURATION: 99 % | SYSTOLIC BLOOD PRESSURE: 123 MMHG | DIASTOLIC BLOOD PRESSURE: 85 MMHG | BODY MASS INDEX: 28 KG/M2 | WEIGHT: 158 LBS | TEMPERATURE: 97.6 F | HEART RATE: 71 BPM | HEIGHT: 63 IN

## 2024-05-13 LAB
CULTURE RESULTS: SIGNIFICANT CHANGE UP
SPECIMEN SOURCE: SIGNIFICANT CHANGE UP

## 2024-05-13 PROCEDURE — 99203 OFFICE O/P NEW LOW 30 MIN: CPT

## 2024-05-13 NOTE — HISTORY OF PRESENT ILLNESS
[FreeTextEntry1] : 34 F PMH   In jan had labs with pcp- joint pain, bone pain left arm, coudlnt move arm used tylenol/advil ice, fatigue pain into her shoulder also has some pain in knees  pain in elbow goes up to arm x rays elbow shoulder and neck were normal feels stiffness  pain hips and ankles-fluctuates  not in fingers or wrists-has carpal tunnel no headaches no visual /hearing changes  sometimes also has on right had those symptoms since 2 -3 months  did not notice any tick bites bug bites   when she gave birth in july had rash all over torso/arms, itchy resolved with anithistamine and rash  saturday when to [facility] thought she and ectopic had left shoulder tip pain, neck pain- and then got better only took tylenol  saw rheum april started on doxy because of Igm positive couldnt take it since she was nauseous  took at least 2 weeks but wasnt consistently - had good and bad days   Allergies-hives flagyl  Meds- xanax prn, adderal when needed for focus PSH- 2 c sections 2023/2010, umbilical hernia repair, knee surgery knee right surgery 2013 [facility]- mom has lupus   symptoms better since november when on doxy felt like she was going to pass out   a week ago was pregnant

## 2024-05-14 LAB
ALBUMIN SERPL ELPH-MCNC: 4.7 G/DL
ALP BLD-CCNC: 105 U/L
ALT SERPL-CCNC: 22 U/L
ANION GAP SERPL CALC-SCNC: 12 MMOL/L
AST SERPL-CCNC: 21 U/L
BASOPHILS # BLD AUTO: 0.07 K/UL
BASOPHILS NFR BLD AUTO: 0.7 %
BILIRUB SERPL-MCNC: 0.6 MG/DL
BUN SERPL-MCNC: 15 MG/DL
CALCIUM SERPL-MCNC: 9.4 MG/DL
CHLORIDE SERPL-SCNC: 101 MMOL/L
CO2 SERPL-SCNC: 25 MMOL/L
CREAT SERPL-MCNC: 0.68 MG/DL
EGFR: 118 ML/MIN/1.73M2
EOSINOPHIL # BLD AUTO: 0.21 K/UL
EOSINOPHIL NFR BLD AUTO: 2 %
GLUCOSE SERPL-MCNC: 91 MG/DL
HCT VFR BLD CALC: 42.1 %
HGB BLD-MCNC: 13.1 G/DL
IMM GRANULOCYTES NFR BLD AUTO: 0.3 %
LYMPHOCYTES # BLD AUTO: 2.58 K/UL
LYMPHOCYTES NFR BLD AUTO: 24.5 %
MAN DIFF?: NORMAL
MCHC RBC-ENTMCNC: 30 PG
MCHC RBC-ENTMCNC: 31.1 GM/DL
MCV RBC AUTO: 96.6 FL
MONOCYTES # BLD AUTO: 0.7 K/UL
MONOCYTES NFR BLD AUTO: 6.7 %
NEUTROPHILS # BLD AUTO: 6.93 K/UL
NEUTROPHILS NFR BLD AUTO: 65.8 %
PLATELET # BLD AUTO: 296 K/UL
POTASSIUM SERPL-SCNC: 4.3 MMOL/L
PROT SERPL-MCNC: 7.8 G/DL
RBC # BLD: 4.36 M/UL
RBC # FLD: 12.9 %
SODIUM SERPL-SCNC: 138 MMOL/L
WBC # FLD AUTO: 10.52 K/UL

## 2024-09-13 ENCOUNTER — APPOINTMENT (OUTPATIENT)
Dept: OBGYN | Facility: CLINIC | Age: 34
End: 2024-09-13

## 2024-09-28 ENCOUNTER — NON-APPOINTMENT (OUTPATIENT)
Age: 34
End: 2024-09-28

## 2025-01-03 ENCOUNTER — NON-APPOINTMENT (OUTPATIENT)
Age: 35
End: 2025-01-03

## 2025-05-18 ENCOUNTER — NON-APPOINTMENT (OUTPATIENT)
Age: 35
End: 2025-05-18

## 2025-08-11 ENCOUNTER — NON-APPOINTMENT (OUTPATIENT)
Age: 35
End: 2025-08-11

## (undated) DEVICE — FOLEY TRAY 16FR 5CC LF BAG CLOSED

## (undated) DEVICE — NEEDLE COUNTER  FOAM AND MAGNET 40-70

## (undated) DEVICE — TROCAR COVIDIEN VERSAONE OPTICAL BLADELESS 5MM

## (undated) DEVICE — DRAPE HALF SHEET 40X57"

## (undated) DEVICE — SYR CONTROL LUER LOK 10CC

## (undated) DEVICE — NDL HYPO REGULAR BEVEL 25G X 1.5"

## (undated) DEVICE — DRSG STERISTRIPS 0.5X4"

## (undated) DEVICE — GLV 8.5 PROTEXIS

## (undated) DEVICE — WRAP COMPRESSION CALF MED

## (undated) DEVICE — BLANKET WARMER UPPER ADULT

## (undated) DEVICE — PACK GYN LAPAROSCOPY

## (undated) DEVICE — SYR LUER LOK 10CC

## (undated) DEVICE — PNEUMOPERITONEUM NDL -GYN

## (undated) DEVICE — SUT MONOCRYL 3-0 27" PS-2 UNDYED

## (undated) DEVICE — BLADE SURGICAL #10 CARBON

## (undated) DEVICE — TROCAR COVIDIEN VERSAPORT OPTICAL BLADELESS 11MM STD

## (undated) DEVICE — UTERINE MANIPULATOR COOPER SURGICAL 5MM 33CM GREEN

## (undated) DEVICE — CANNULA COVIDIEN VERSAONE UNIVERSAL STANDARD 5MM

## (undated) DEVICE — LIGASURE BLUNT TIP NANO CTD 37CM

## (undated) DEVICE — TUBING MEDI-VAC W MAXIGRIP CONNECTORS 1/4"X6'

## (undated) DEVICE — BLADE SURGICAL #15 CARBON

## (undated) DEVICE — GLV 8 PROTEXIS

## (undated) DEVICE — SUCTION YANKAUER TAPERED BULBOUS NO VENT